# Patient Record
Sex: FEMALE
[De-identification: names, ages, dates, MRNs, and addresses within clinical notes are randomized per-mention and may not be internally consistent; named-entity substitution may affect disease eponyms.]

---

## 2021-12-16 ENCOUNTER — TRANSCRIPTION ENCOUNTER (OUTPATIENT)
Age: 31
End: 2021-12-16

## 2022-01-02 ENCOUNTER — TRANSCRIPTION ENCOUNTER (OUTPATIENT)
Age: 32
End: 2022-01-02

## 2022-03-31 ENCOUNTER — TRANSCRIPTION ENCOUNTER (OUTPATIENT)
Age: 32
End: 2022-03-31

## 2022-11-30 ENCOUNTER — NON-APPOINTMENT (OUTPATIENT)
Age: 32
End: 2022-11-30

## 2022-12-02 ENCOUNTER — APPOINTMENT (OUTPATIENT)
Dept: OBGYN | Facility: CLINIC | Age: 32
End: 2022-12-02

## 2022-12-02 ENCOUNTER — RESULT CHARGE (OUTPATIENT)
Age: 32
End: 2022-12-02

## 2022-12-02 VITALS — SYSTOLIC BLOOD PRESSURE: 112 MMHG | WEIGHT: 135 LBS | DIASTOLIC BLOOD PRESSURE: 67 MMHG

## 2022-12-02 DIAGNOSIS — N94.6 DYSMENORRHEA, UNSPECIFIED: ICD-10-CM

## 2022-12-02 DIAGNOSIS — Z00.00 ENCOUNTER FOR GENERAL ADULT MEDICAL EXAMINATION W/OUT ABNORMAL FINDINGS: ICD-10-CM

## 2022-12-02 DIAGNOSIS — K62.89 OTHER SPECIFIED DISEASES OF ANUS AND RECTUM: ICD-10-CM

## 2022-12-02 DIAGNOSIS — R10.2 PELVIC AND PERINEAL PAIN: ICD-10-CM

## 2022-12-02 DIAGNOSIS — N94.12 DEEP DYSPAREUNIA: ICD-10-CM

## 2022-12-02 LAB
BILIRUB UR QL STRIP: NORMAL
CLARITY UR: CLEAR
COLLECTION METHOD: NORMAL
GLUCOSE UR-MCNC: NORMAL
HCG UR QL: 0.2 EU/DL
HEMOGLOBIN: 9.9
HGB UR QL STRIP.AUTO: NORMAL
KETONES UR-MCNC: NORMAL
LEUKOCYTE ESTERASE UR QL STRIP: NORMAL
NITRITE UR QL STRIP: NORMAL
PH UR STRIP: 6
PROT UR STRIP-MCNC: NORMAL
SP GR UR STRIP: 1.01

## 2022-12-02 PROCEDURE — 81003 URINALYSIS AUTO W/O SCOPE: CPT | Mod: QW

## 2022-12-02 PROCEDURE — 85018 HEMOGLOBIN: CPT | Mod: QW

## 2022-12-02 PROCEDURE — 99214 OFFICE O/P EST MOD 30 MIN: CPT

## 2022-12-04 LAB — CANCER AG125 SERPL-ACNC: 14 U/ML

## 2022-12-05 ENCOUNTER — NON-APPOINTMENT (OUTPATIENT)
Age: 32
End: 2022-12-05

## 2022-12-20 ENCOUNTER — APPOINTMENT (OUTPATIENT)
Dept: OBGYN | Facility: CLINIC | Age: 32
End: 2022-12-20

## 2022-12-27 NOTE — HISTORY OF PRESENT ILLNESS
[FreeTextEntry1] : Patient is a 31y/o female here today for a consultation for possible endometriosis. \par She is \par She C/o dysmenorrhea, dyspareunia, constipation, bloating, rectal pain during her period.\par She curently is not on OCPs. \par Last pap 2022 ADELINA [place]ket

## 2022-12-27 NOTE — PHYSICAL EXAM
[Chaperone Present] : A chaperone was present in the examining room during all aspects of the physical examination [Appropriately responsive] : appropriately responsive [Alert] : alert [Soft] : soft [Non-tender] : non-tender [Oriented x3] : oriented x3 [Labia Majora] : normal [Labia Minora] : normal [Normal] : normal [Uterine Adnexae] : normal [FreeTextEntry1] : Marah HUGO-CHRISTIANO chaperoned during entire physical exam [FreeTextEntry6] : mobile  [FreeTextEntry9] : no nodularity

## 2022-12-27 NOTE — PLAN
[FreeTextEntry1] : \par Plan  and transvaginal US to evaluate for endometriosis. see scanned in image. If  or pelvic sono are abnormal plan for MRI. Referred her to [facility] for egg freezing \par all of her questions were answered she is agreeable with plan \par \par \par \par I Marah ALEJOC am scribing for the presence of Dr. Frey the following sections HISTORY OF PRESENT ILLNESS, PAST MEDICAL/FAMILY/SOCIAL HISTORY; REVIEW OF SYSTEMS; VITAL SIGNS; PHYSICAL EXAM; DISPOSITION. \par \par \par I personally performed the services described in the documentation, reviewed the documentation recorded by the scribe in my presence and it accurately and completely records my words and actions.\par

## 2022-12-28 ENCOUNTER — NON-APPOINTMENT (OUTPATIENT)
Age: 32
End: 2022-12-28

## 2023-01-04 ENCOUNTER — ASOB RESULT (OUTPATIENT)
Age: 33
End: 2023-01-04

## 2023-01-04 ENCOUNTER — RESULT CHARGE (OUTPATIENT)
Age: 33
End: 2023-01-04

## 2023-01-04 ENCOUNTER — APPOINTMENT (OUTPATIENT)
Dept: ANTEPARTUM | Facility: CLINIC | Age: 33
End: 2023-01-04
Payer: COMMERCIAL

## 2023-01-04 ENCOUNTER — APPOINTMENT (OUTPATIENT)
Dept: OBGYN | Facility: CLINIC | Age: 33
End: 2023-01-04
Payer: COMMERCIAL

## 2023-01-04 LAB
BILIRUB UR QL STRIP: NORMAL
CLARITY UR: CLEAR
COLLECTION METHOD: NORMAL
GLUCOSE UR-MCNC: NORMAL
HCG UR QL: 0.2 EU/DL
HGB UR QL STRIP.AUTO: NORMAL
KETONES UR-MCNC: NORMAL
LEUKOCYTE ESTERASE UR QL STRIP: NORMAL
NITRITE UR QL STRIP: NORMAL
PH UR STRIP: 7.5
PROT UR STRIP-MCNC: NORMAL
SP GR UR STRIP: 1.01

## 2023-01-04 PROCEDURE — 76830 TRANSVAGINAL US NON-OB: CPT

## 2023-01-04 PROCEDURE — 99214 OFFICE O/P EST MOD 30 MIN: CPT

## 2023-01-04 PROCEDURE — 81003 URINALYSIS AUTO W/O SCOPE: CPT | Mod: QW

## 2023-01-04 PROCEDURE — 76856 US EXAM PELVIC COMPLETE: CPT | Mod: 59

## 2023-01-05 NOTE — PLAN
[FreeTextEntry1] : \par patient to start Loloestrin to help with her dysmenorrhea, and possible endometriosis. RIsks and  Benefits of OCPs discussed in detail \par She is to f/u with me in 4 months for a pill check. \par she is to call me if she feels she has any side effects. \par all of her questions were answered she is agreeable with plan\par \par \par \par I Marah HUGO-C am scribing for the presence of Dr. Frey the following sections HISTORY OF PRESENT ILLNESS, PAST MEDICAL/FAMILY/SOCIAL HISTORY; REVIEW OF SYSTEMS; VITAL SIGNS; PHYSICAL EXAM; DISPOSITION. \par \par \par I personally performed the services described in the documentation, reviewed the documentation recorded by the scribe in my presence and it accurately and completely records my words and actions.\par

## 2023-01-05 NOTE — HISTORY OF PRESENT ILLNESS
[FreeTextEntry1] : \par \par Patient is a 31 y/o female here today for sonogram follow up. She C/o dysmenorrhea, dyspareunia, constipation, bloating, rectal pain during her period.\par Her sonogram results are benign. ovaries are WNL and uterus WNL \par

## 2023-02-13 RX ORDER — NORETHINDRONE ACETATE AND ETHINYL ESTRADIOL, ETHINYL ESTRADIOL AND FERROUS FUMARATE 1MG-10(24)
1 MG-10 MCG / KIT ORAL
Qty: 3 | Refills: 3 | Status: ACTIVE | COMMUNITY
Start: 2023-01-04

## 2023-05-12 ENCOUNTER — APPOINTMENT (OUTPATIENT)
Dept: OBGYN | Facility: CLINIC | Age: 33
End: 2023-05-12

## 2024-06-10 ENCOUNTER — APPOINTMENT (OUTPATIENT)
Dept: ORTHOPEDIC SURGERY | Facility: CLINIC | Age: 34
End: 2024-06-10
Payer: COMMERCIAL

## 2024-06-10 VITALS
HEIGHT: 64 IN | BODY MASS INDEX: 23.9 KG/M2 | WEIGHT: 140 LBS | DIASTOLIC BLOOD PRESSURE: 80 MMHG | SYSTOLIC BLOOD PRESSURE: 122 MMHG | HEART RATE: 65 BPM

## 2024-06-10 DIAGNOSIS — M25.562 PAIN IN LEFT KNEE: ICD-10-CM

## 2024-06-10 PROCEDURE — 99203 OFFICE O/P NEW LOW 30 MIN: CPT

## 2024-06-10 PROCEDURE — 73562 X-RAY EXAM OF KNEE 3: CPT | Mod: LT

## 2024-06-10 NOTE — PHYSICAL EXAM
[de-identified] : General: Awake, alert, no acute distress, Patient was cooperative and appropriate during the examination.  The patient is of normal weight for height and age.  Walks without an antalgic gait.   Left knee Examination: Physical examination of the knee demonstrates normal skin without signs of skin changes or abnormalities. No erythema, warmth, or joint effusion is appreciated .  Sensation is intact to light touch L2-S1 Palpable DP/PT pulse EHL/FHL/TA/GSC motor function intact  Range of Motion 0-130 degrees  Strength Testing Quadriceps/Hamstring 5/5 Patient is able to perform a straight leg raise without difficulty.  Palpation Not tender to palpation about the distal femur or proximal tibia Mildly tender to palpation about the patellofemoral compartment No palpable defect appreciated in the quadriceps or patellar tendons Mildly tender to palpation of medial joint line Not tender to palpation of lateral joint line  Special Tests Anterior Drawer negative Posterior Drawer negative Lachman Exam negative No Varus or Valgus Laxity at 0 or 30 degrees of knee flexion Effie's Test negative for pain or crepitus Active compression of the patella negative for pain or crepitus Translation of the patella 2 quadrants with a firm endpoint [de-identified] : X-rays including 3 views of the left knee were obtained in the office on 6/10/2024 and reviewed with the patient.  There is no acute fracture or dislocation.  There is no significant arthritis.

## 2024-06-10 NOTE — DISCUSSION/SUMMARY
[de-identified] : Assessment: 34-year-old female with left knee pain secondary to a strain versus patellofemoral syndrome  Plan: I had a long discussion with the patient today regarding the nature of their diagnosis and treatment plan. We discussed the risks and benefits of no treatment as well as nonoperative and operative treatments.  I reviewed the patient's x-rays today with her in the office which are negative for any acute pathology.  On examination she has mild tenderness palpation of the medial aspect of the knee and patellofemoral compartment.  She has no swelling or mechanical symptoms.  At this time I recommend conservative treatment of the patient's condition with modalities including rest, ice, heat, anti-inflammatory medications, activity modifications, and home stretching and strengthening exercises. I discussed with the patient the risks and benefits associated with NSAID use. GI precautions were discussed.  A referral for physical therapy was provided to begin working on exercises to help improve their strength and function.  We discussed changing out her footwear since she has been using the same running shoes for 2 years.  She will follow-up in 6 to 8 weeks as needed.  If symptoms persist we may consider an MRI.  The patient verbalizes their understanding and agrees with the plan.  All questions were answered to their satisfaction.  I, Dr. Starkey, personally performed the evaluation and management (E/M) services for this new patient.  That E/M includes conducting the clinically appropriate initial history &/or exam, assessing all conditions, and establishing the plan of care.  Today, my JOSEPH, was here to observe my evaluation and management service for this patient & follow plan of care established by me going forward.

## 2024-06-10 NOTE — HISTORY OF PRESENT ILLNESS
[de-identified] : 6/10/2024: Rena is a pleasant 34-year-old female who presents to the office today for evaluation of left knee pain.  The patient states that her pain began about 1 to 2 weeks ago but she denies any history of trauma.  She is very active and runs at least once a week and lifts several days a week.  The pain has been isolated to the medial aspect the knee.  She denies any swelling or mechanical symptoms.  She does have pain with her knee in a flexed position for extended period of time.  She has never had the symptoms before.  She has not had any treatment yet.  The patient denies any fevers, chills, sweats, recent illnesses, numbness, tingling, weakness, or pain elsewhere at this time.

## 2024-11-30 ENCOUNTER — NON-APPOINTMENT (OUTPATIENT)
Age: 34
End: 2024-11-30